# Patient Record
Sex: FEMALE | Race: BLACK OR AFRICAN AMERICAN | URBAN - METROPOLITAN AREA
[De-identification: names, ages, dates, MRNs, and addresses within clinical notes are randomized per-mention and may not be internally consistent; named-entity substitution may affect disease eponyms.]

---

## 2023-01-25 ENCOUNTER — TELEPHONE ENCOUNTER (OUTPATIENT)
Dept: URBAN - METROPOLITAN AREA CLINIC 113 | Facility: CLINIC | Age: 53
End: 2023-01-25

## 2023-03-03 ENCOUNTER — TELEPHONE ENCOUNTER (OUTPATIENT)
Dept: URBAN - METROPOLITAN AREA CLINIC 6 | Facility: CLINIC | Age: 53
End: 2023-03-03

## 2023-08-03 ENCOUNTER — OFFICE VISIT (OUTPATIENT)
Dept: URBAN - METROPOLITAN AREA CLINIC 113 | Facility: CLINIC | Age: 53
End: 2023-08-03
Payer: COMMERCIAL

## 2023-08-03 ENCOUNTER — DASHBOARD ENCOUNTERS (OUTPATIENT)
Age: 53
End: 2023-08-03

## 2023-08-03 ENCOUNTER — WEB ENCOUNTER (OUTPATIENT)
Dept: URBAN - METROPOLITAN AREA CLINIC 113 | Facility: CLINIC | Age: 53
End: 2023-08-03

## 2023-08-03 VITALS
BODY MASS INDEX: 40.08 KG/M2 | HEART RATE: 71 BPM | SYSTOLIC BLOOD PRESSURE: 121 MMHG | HEIGHT: 63 IN | WEIGHT: 226.2 LBS | RESPIRATION RATE: 16 BRPM | TEMPERATURE: 97.1 F | DIASTOLIC BLOOD PRESSURE: 78 MMHG

## 2023-08-03 DIAGNOSIS — Z86.19 HISTORY OF PARASITIC INFECTION: ICD-10-CM

## 2023-08-03 DIAGNOSIS — Z12.11 COLON CANCER SCREENING: ICD-10-CM

## 2023-08-03 DIAGNOSIS — K82.8 BILIARY DYSKINESIA: ICD-10-CM

## 2023-08-03 PROCEDURE — 99204 OFFICE O/P NEW MOD 45 MIN: CPT | Performed by: INTERNAL MEDICINE

## 2023-08-03 PROCEDURE — 99244 OFF/OP CNSLTJ NEW/EST MOD 40: CPT | Performed by: INTERNAL MEDICINE

## 2023-08-03 RX ORDER — PHENTERMINE HYDROCHLORIDE 37.5 MG/1
1 CAPSULE CAPSULE ORAL ONCE A DAY
Status: ACTIVE | COMMUNITY

## 2023-08-03 NOTE — HPI-TODAY'S VISIT:
52-year-old female is referred by Veda Zacarias PA-C for routine colon cancer screening and gallbladder dyskinesia.  A copy of today's visit will be forwarded to the referring provider. Labs 7/21/2022:Hemoglobin A1c 5.6, hemoglobin 9.8, normal hematocrit, low MCV 76.8, normal insulin levels, unremarkable lipid panel, unremarkable CMP, low serum iron 25, elevated TIBC 440, low iron saturation 5.7%, low serum ferritin of 7. Labs 7/17/2023:Normal hemoglobin, elevated hematocrit 44.4, normal MCV, normal platelet count, unremarkable CMP, normal iron panel, normal serum ferritin, hemoglobin A1c 5.6.  She has occasional constipation. This improves with increased water intake. Denies blood per rectum. She was found to have biliary dyskinesia. She had a HIDA scan notable for 15% EF. She denies nausea or vomiting. Denies abdominal pain. She has only had "one gallbladder attack." She was consulted by Dr. Rashid. He recommended against surgery unless symptoms recur. Denies significant heartburn. Denies a family history of colon cancer. She has never had a colonoscopy.   She did defecate a long white worm on one occasion over two years ago. She was treated with medication and never completed a second course. She has not seen any worms in her stools ever since.

## 2023-08-29 ENCOUNTER — TELEPHONE ENCOUNTER (OUTPATIENT)
Dept: URBAN - METROPOLITAN AREA CLINIC 113 | Facility: CLINIC | Age: 53
End: 2023-08-29

## 2023-09-19 ENCOUNTER — OFFICE VISIT (OUTPATIENT)
Dept: URBAN - METROPOLITAN AREA MEDICAL CENTER 2 | Facility: MEDICAL CENTER | Age: 53
End: 2023-09-19

## 2023-11-28 ENCOUNTER — OFFICE VISIT (OUTPATIENT)
Dept: URBAN - METROPOLITAN AREA MEDICAL CENTER 43 | Facility: MEDICAL CENTER | Age: 53
End: 2023-11-28